# Patient Record
Sex: FEMALE | Race: WHITE
[De-identification: names, ages, dates, MRNs, and addresses within clinical notes are randomized per-mention and may not be internally consistent; named-entity substitution may affect disease eponyms.]

---

## 2019-01-23 ENCOUNTER — HOSPITAL ENCOUNTER (INPATIENT)
Dept: HOSPITAL 53 - M ED | Age: 24
LOS: 5 days | Discharge: HOME | DRG: 755 | End: 2019-01-28
Attending: PSYCHIATRY & NEUROLOGY | Admitting: PSYCHIATRY & NEUROLOGY
Payer: MEDICAID

## 2019-01-23 VITALS — BODY MASS INDEX: 34.23 KG/M2 | WEIGHT: 212.97 LBS | HEIGHT: 66 IN

## 2019-01-23 VITALS — DIASTOLIC BLOOD PRESSURE: 84 MMHG | SYSTOLIC BLOOD PRESSURE: 139 MMHG

## 2019-01-23 DIAGNOSIS — F60.6: ICD-10-CM

## 2019-01-23 DIAGNOSIS — R45.851: ICD-10-CM

## 2019-01-23 DIAGNOSIS — F32.9: ICD-10-CM

## 2019-01-23 DIAGNOSIS — F43.10: Primary | ICD-10-CM

## 2019-01-23 LAB
ALBUMIN SERPL BCG-MCNC: 3.8 GM/DL (ref 3.2–5.2)
ALT SERPL W P-5'-P-CCNC: 17 U/L (ref 12–78)
AMPHETAMINES UR QL SCN: NEGATIVE
APAP SERPL-MCNC: < 2 UG/ML (ref 10–30)
B-HCG SERPL QL: NEGATIVE
BARBITURATES UR QL SCN: NEGATIVE
BENZODIAZ UR QL SCN: NEGATIVE
BILIRUB CONJ SERPL-MCNC: 0.1 MG/DL (ref 0–0.2)
BILIRUB SERPL-MCNC: 0.4 MG/DL (ref 0.2–1)
BUN SERPL-MCNC: 8 MG/DL (ref 7–18)
BZE UR QL SCN: NEGATIVE
CALCIUM SERPL-MCNC: 8.6 MG/DL (ref 8.5–10.1)
CANNABINOIDS UR QL SCN: NEGATIVE
CHLORIDE SERPL-SCNC: 107 MEQ/L (ref 98–107)
CO2 SERPL-SCNC: 25 MEQ/L (ref 21–32)
CREAT SERPL-MCNC: 0.71 MG/DL (ref 0.55–1.3)
ETHANOL SERPL-MCNC: < 0.003 % (ref 0–0.01)
GFR SERPL CREATININE-BSD FRML MDRD: > 60 ML/MIN/{1.73_M2} (ref 60–?)
GLUCOSE SERPL-MCNC: 94 MG/DL (ref 70–100)
HCT VFR BLD AUTO: 42.1 % (ref 36–47)
HGB BLD-MCNC: 13.8 G/DL (ref 12–15.5)
MCH RBC QN AUTO: 27.8 PG (ref 27–33)
MCHC RBC AUTO-ENTMCNC: 32.8 G/DL (ref 32–36.5)
MCV RBC AUTO: 84.7 FL (ref 80–96)
METHADONE UR QL SCN: NEGATIVE
OPIATES UR QL SCN: NEGATIVE
PCP UR QL SCN: NEGATIVE
PLATELET # BLD AUTO: 432 10^3/UL (ref 150–450)
POTASSIUM SERPL-SCNC: 4.1 MEQ/L (ref 3.5–5.1)
PROT SERPL-MCNC: 7.6 GM/DL (ref 6.4–8.2)
RBC # BLD AUTO: 4.97 10^6/UL (ref 4–5.4)
SALICYLATES SERPL-MCNC: < 1.7 MG/DL (ref 5–30)
SODIUM SERPL-SCNC: 140 MEQ/L (ref 136–145)
TSH SERPL DL<=0.005 MIU/L-ACNC: 1.7 UIU/ML (ref 0.36–3.74)
WBC # BLD AUTO: 7.7 10^3/UL (ref 4–10)

## 2019-01-24 VITALS — DIASTOLIC BLOOD PRESSURE: 70 MMHG | SYSTOLIC BLOOD PRESSURE: 119 MMHG

## 2019-01-24 VITALS — DIASTOLIC BLOOD PRESSURE: 53 MMHG | SYSTOLIC BLOOD PRESSURE: 105 MMHG

## 2019-01-24 RX ADMIN — FLUTICASONE PROPIONATE SCH SPRAY: 50 SPRAY, METERED NASAL at 11:07

## 2019-01-24 RX ADMIN — CIPROFLOXACIN HYDROCHLORIDE SCH MG: 500 TABLET, FILM COATED ORAL at 17:14

## 2019-01-24 NOTE — ECGEPIP
Stationary ECG Study

                              [facility]

                                       

                                       Test Date:    2019

Pat Name:     TOMMY JORDAN             Department:   

Patient ID:   [id number]                 Room:         [place] [address]

Gender:       F                        Technician:   JUVENAL

:          1995               Requested By: Lisbet Cuevas 

Order Number: [id number]     Reading MD:   Juan Sams

                                 Measurements

Intervals                              Axis          

Rate:         76                       P:            37

MO:           142                      QRS:          52

QRSD:         86                       T:            34

QT:           402                                    

QTc:          453                                    

                           Interpretive Statements

Normal sinus rhythm with sinus arrhythmia

Delayed anterior R-wave progression

Nonspecific repolarization abnormalities

Comparison tracing available

Electronically Signed On 2019 19:14:06 EST by Juan Sams

## 2019-01-24 NOTE — HPEPDOC
[facility] Medical History & Physical


Date of Admission


Jan 23, 2019





History and Physical





PCP: None


ATTENDING: Dr. Carlos Daugherty





HPI: 23yoF admitted to [facility] for unspecified depressive disordder,  being 

medically examined today. 


The patient states she was recently treated for a right ear infection. She 

states symptoms are improved. She denies ear pain. She denies any fevers, 

chills, sore throat, rhinorrhea however she does still have some pressure in the

right ear.


Denies any fevers, chills, weakness, fatigue, HA, CP, SOB, cough, palpitations, 

abdominal pain, N/V/D or changes in bowel or bladder habits.





PMHx: 


Anxiety


Depression


PTSD





PSHX:


Oral surgery











SOCHX:


Resides in: [facility]


Marital Status: 


Kids: 1


Employment: Unemployed


Tobacco use: Denies


ETOH: Less than one drink per month


Illicit Drugs: Denies


IV Drug Use: Denies


Tattoos done unprofessionally: Denies 





FAMHX:


Mother: Alive, bipolar disorder


Father: Alive, COPD/PTSD


Siblings: One brother Alive, well


Children: Alive, well


Unexpected deaths due to medical reasons: None.





ROS:


As noted in HPI, otherwise 11pt ROS of systems reviewed and remarkable only for 

LMP 1/22/19


                 


PE:      


GEN: 24 yo F, appears stated age. Well-nourished, well developed. No acute 

distress. Alert and oriented x 3. Pleasant, interactive. 


HEENT: Normocephalic, atraumatic. Pupils are equal, round, and reactive to 

light. Extraocular movements are intact. No nystagmus appreciated. Sclera are 

nonicteric. Conjunctiva without injection. Nose midline. Nasal turbinates with

out bogginess. EACs both patent BL. The left TM visualized and gray with good 

cone of light, no bulging or erythema. There is serous fluid noted behind the 

right TM. No bulging. No erythema. No facial asymmetry. Moist mucous membranes. 

Dentition fair. Pharynx pink and moist, no cobblestoning. Neck supple, trachea 

midline. No lymphadenopathy or thyromegaly appreciated. 


CHEST: Regular rate and rhythm, +S1, +S2


LUNGS: Clear to auscultation bilaterally. No wheezes, rales, or rhonchi. 

Breathing appears symmetric and easy. Patient is speaking in full sentences. No 

accessory muscle use.


ABD: Round, soft, non-tender, non-distended. +Bowel sounds throughout. No 

rebound or guarding. No costovertebral angle tenderness.


EXT: Pulses 2+ bilaterally dorsalis pedis and radial. No lower extremity edema 

appreciated.


SKIN: Pink, dry, warm. Capillary refill <2sec. No rashes.


NEURO: Alert and oriented x 3. Cranial nerves III-XII are intact. No focal 

deficits appreciated. 





EKG:  Pending





A&P: 23yoF admitted to [facility] for unspecified depressive disordder


1. Psych. Plan per Psychiatry. Obtain baseline EKG to assure the safety of 

psychiatric medications as they can prolong the QT interval.


2. Follow up. No Primary Care Provider. Will attempt to establish PCP on 

discharge.


3. Rt Eustachian tube dysfunction. 


Pt is afebrile. 


No leukocytosis. 


Add Flonase 2 sprays each nostril daily. 


Monitor. 


4. Staff member Romelia WANG present throughout exam.





Vital Signs





Vital Signs








  Date Time  Temp Pulse Resp B/P (MAP) Pulse Ox O2 Delivery O2 Flow Rate FiO2


 


1/24/19 06:42 97.0 88 14 105/53 (70)  Room Air  


 


1/23/19 18:28     97   











Laboratory Data


Labs 24H


Laboratory Tests 2


1/23/19 13:55: 


Nucleated Red Blood Cells % (auto) 0.0, Anion Gap 8, Glomerular Filtration Rate 

> 60.0, Calcium Level 8.6, Aspartate Amino Transf (AST/SGOT) 16, Alanine 

Aminotransferase (ALT/SGPT) 17, Alkaline Phosphatase 84, Total Bilirubin 0.4, 

Direct Bilirubin 0.1, Total Protein 7.6, Albumin 3.8, Albumin/Globulin Ratio 

1.00, Thyroid Stimulating Hormone (TSH) 1.700, Human Chorionic Gonadotropin, 

Qual NEGATIVE, Salicylates Level < 1.7L, Urine Amphetamines Screen NEGATIVE, 

Urine Benzodiazepines Screen NEGATIVE, Urine Opiates Screen NEGATIVE, Urine 

Methadone Screen NEGATIVE, Acetaminophen Level < 2.0L, Urine Barbiturates Screen

NEGATIVE, Urine Phencyclidine Screen NEGATIVE, Urine Cocaine Metabolite Screen 

NEGATIVE, Urine Cannabinoids Screen NEGATIVE, Ethyl Alcohol Level < 0.003


CBC/BMP


Laboratory Tests


1/23/19 13:55








Red Blood Count 4.97, Mean Corpuscular Volume 84.7, Mean Corpuscular Hemoglobin 

27.8, Mean Corpuscular Hemoglobin Concent 32.8, Red Cell Distribution Width 13.2





Home Medications


Scheduled


Ciprofloxacin HCl (Ciprofloxacin HCl) 500 Mg Tab, 500 MG PO BID





Allergies


Coded Allergies:  


     No Known Allergies (Unverified , 1/23/19)











Lisbet Cuevas              Jan 24, 2019 09:58
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
## 2019-01-25 VITALS — DIASTOLIC BLOOD PRESSURE: 70 MMHG | SYSTOLIC BLOOD PRESSURE: 119 MMHG

## 2019-01-25 VITALS — SYSTOLIC BLOOD PRESSURE: 124 MMHG | DIASTOLIC BLOOD PRESSURE: 84 MMHG

## 2019-01-25 RX ADMIN — CIPROFLOXACIN HYDROCHLORIDE SCH MG: 500 TABLET, FILM COATED ORAL at 05:54

## 2019-01-25 RX ADMIN — FLUTICASONE PROPIONATE SCH SPRAY: 50 SPRAY, METERED NASAL at 09:02

## 2019-01-25 RX ADMIN — CIPROFLOXACIN HYDROCHLORIDE SCH MG: 500 TABLET, FILM COATED ORAL at 17:50
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
## 2019-01-26 VITALS — DIASTOLIC BLOOD PRESSURE: 80 MMHG | SYSTOLIC BLOOD PRESSURE: 132 MMHG

## 2019-01-26 VITALS — SYSTOLIC BLOOD PRESSURE: 96 MMHG | DIASTOLIC BLOOD PRESSURE: 51 MMHG

## 2019-01-26 RX ADMIN — CIPROFLOXACIN HYDROCHLORIDE SCH MG: 500 TABLET, FILM COATED ORAL at 13:29

## 2019-01-26 RX ADMIN — FLUTICASONE PROPIONATE SCH SPRAY: 50 SPRAY, METERED NASAL at 08:29

## 2019-01-26 RX ADMIN — CIPROFLOXACIN HYDROCHLORIDE SCH MG: 500 TABLET, FILM COATED ORAL at 17:46

## 2019-01-26 RX ADMIN — CIPROFLOXACIN HYDROCHLORIDE SCH MG: 500 TABLET, FILM COATED ORAL at 08:29

## 2019-01-27 VITALS — DIASTOLIC BLOOD PRESSURE: 63 MMHG | SYSTOLIC BLOOD PRESSURE: 144 MMHG

## 2019-01-27 VITALS — DIASTOLIC BLOOD PRESSURE: 78 MMHG | SYSTOLIC BLOOD PRESSURE: 132 MMHG

## 2019-01-27 RX ADMIN — CIPROFLOXACIN HYDROCHLORIDE SCH MG: 500 TABLET, FILM COATED ORAL at 08:52

## 2019-01-27 RX ADMIN — CIPROFLOXACIN HYDROCHLORIDE SCH MG: 500 TABLET, FILM COATED ORAL at 12:40

## 2019-01-27 RX ADMIN — FLUTICASONE PROPIONATE SCH SPRAY: 50 SPRAY, METERED NASAL at 08:51

## 2019-01-27 RX ADMIN — CIPROFLOXACIN HYDROCHLORIDE SCH MG: 500 TABLET, FILM COATED ORAL at 17:34
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
## 2019-01-28 VITALS — SYSTOLIC BLOOD PRESSURE: 153 MMHG | DIASTOLIC BLOOD PRESSURE: 81 MMHG

## 2019-01-28 RX ADMIN — FLUTICASONE PROPIONATE SCH SPRAY: 50 SPRAY, METERED NASAL at 08:13

## 2019-01-28 RX ADMIN — CIPROFLOXACIN HYDROCHLORIDE SCH MG: 500 TABLET, FILM COATED ORAL at 08:13
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
## 2019-02-25 ENCOUNTER — HOSPITAL ENCOUNTER (INPATIENT)
Dept: HOSPITAL 53 - M ED | Age: 24
LOS: 4 days | Discharge: HOME | DRG: 254 | End: 2019-03-01
Attending: HOSPITALIST | Admitting: HOSPITALIST
Payer: COMMERCIAL

## 2019-02-25 ENCOUNTER — HOSPITAL ENCOUNTER (OUTPATIENT)
Dept: HOSPITAL 53 - M LAB REF | Age: 24
End: 2019-02-25
Attending: PHYSICIAN ASSISTANT
Payer: MEDICAID

## 2019-02-25 VITALS — WEIGHT: 237.88 LBS | HEIGHT: 66 IN | BODY MASS INDEX: 38.23 KG/M2

## 2019-02-25 DIAGNOSIS — R11.2: ICD-10-CM

## 2019-02-25 DIAGNOSIS — Z79.899: ICD-10-CM

## 2019-02-25 DIAGNOSIS — E87.6: ICD-10-CM

## 2019-02-25 DIAGNOSIS — N10: Primary | ICD-10-CM

## 2019-02-25 DIAGNOSIS — N39.0: ICD-10-CM

## 2019-02-25 DIAGNOSIS — K62.89: Primary | ICD-10-CM

## 2019-02-25 DIAGNOSIS — E66.9: ICD-10-CM

## 2019-02-25 DIAGNOSIS — R19.7: ICD-10-CM

## 2019-02-25 DIAGNOSIS — N10: ICD-10-CM

## 2019-02-25 DIAGNOSIS — F43.10: ICD-10-CM

## 2019-02-25 DIAGNOSIS — D72.829: ICD-10-CM

## 2019-02-25 LAB
APPEARANCE UR: (no result)
BACTERIA UR QL AUTO: (no result)
BASOPHILS # BLD AUTO: 0.1 10^3/UL (ref 0–0.2)
BASOPHILS # BLD AUTO: 0.1 10^3/UL (ref 0–0.2)
BASOPHILS NFR BLD AUTO: 0.2 % (ref 0–1)
BASOPHILS NFR BLD AUTO: 0.2 % (ref 0–1)
BILIRUB UR QL STRIP.AUTO: NEGATIVE
BUN SERPL-MCNC: 6 MG/DL (ref 7–18)
BUN SERPL-MCNC: 9 MG/DL (ref 7–18)
CALCIUM SERPL-MCNC: 7.8 MG/DL (ref 8.5–10.1)
CALCIUM SERPL-MCNC: 8.4 MG/DL (ref 8.5–10.1)
CHLORIDE SERPL-SCNC: 100 MEQ/L (ref 98–107)
CHLORIDE SERPL-SCNC: 104 MEQ/L (ref 98–107)
CO2 SERPL-SCNC: 20 MEQ/L (ref 21–32)
CO2 SERPL-SCNC: 24 MEQ/L (ref 21–32)
CREAT SERPL-MCNC: 0.78 MG/DL (ref 0.55–1.3)
CREAT SERPL-MCNC: 0.83 MG/DL (ref 0.55–1.3)
EOSINOPHIL # BLD AUTO: 0 10^3/UL (ref 0–0.5)
EOSINOPHIL # BLD AUTO: 0 10^3/UL (ref 0–0.5)
EOSINOPHIL NFR BLD AUTO: 0 % (ref 0–3)
EOSINOPHIL NFR BLD AUTO: 0.1 % (ref 0–3)
FLUAV RNA UPPER RESP QL NAA+PROBE: NEGATIVE
FLUBV RNA UPPER RESP QL NAA+PROBE: NEGATIVE
GFR SERPL CREATININE-BSD FRML MDRD: > 60 ML/MIN/{1.73_M2} (ref 60–?)
GFR SERPL CREATININE-BSD FRML MDRD: > 60 ML/MIN/{1.73_M2} (ref 60–?)
GLUCOSE SERPL-MCNC: 108 MG/DL (ref 70–100)
GLUCOSE SERPL-MCNC: 130 MG/DL (ref 70–100)
GLUCOSE UR QL STRIP.AUTO: NEGATIVE MG/DL
HCT VFR BLD AUTO: 34.6 % (ref 36–47)
HCT VFR BLD AUTO: 37.2 % (ref 36–47)
HGB BLD-MCNC: 11.8 G/DL (ref 12–15.5)
HGB BLD-MCNC: 11.8 G/DL (ref 12–15.5)
HGB UR QL STRIP.AUTO: (no result)
KETONES UR QL STRIP.AUTO: (no result) MG/DL
LEUKOCYTE ESTERASE UR QL STRIP.AUTO: (no result)
LYMPHOCYTES # BLD AUTO: 0.9 10^3/UL (ref 1.5–6.5)
LYMPHOCYTES # BLD AUTO: 1 10^3/UL (ref 1.5–6.5)
LYMPHOCYTES NFR BLD AUTO: 3.1 % (ref 24–44)
LYMPHOCYTES NFR BLD AUTO: 3.9 % (ref 24–44)
MCH RBC QN AUTO: 27.3 PG (ref 27–33)
MCH RBC QN AUTO: 27.9 PG (ref 27–33)
MCHC RBC AUTO-ENTMCNC: 31.7 G/DL (ref 32–36.5)
MCHC RBC AUTO-ENTMCNC: 34.1 G/DL (ref 32–36.5)
MCV RBC AUTO: 81.8 FL (ref 80–96)
MCV RBC AUTO: 85.9 FL (ref 80–96)
MONOCYTES # BLD AUTO: 1.7 10^3/UL (ref 0–0.8)
MONOCYTES # BLD AUTO: 2 10^3/UL (ref 0–0.8)
MONOCYTES NFR BLD AUTO: 6.8 % (ref 0–5)
MONOCYTES NFR BLD AUTO: 6.9 % (ref 0–5)
MUCOUS THREADS URNS QL MICRO: (no result)
NEUTROPHILS # BLD AUTO: 21.5 10^3/UL (ref 1.8–7.7)
NEUTROPHILS # BLD AUTO: 25.2 10^3/UL (ref 1.8–7.7)
NEUTROPHILS NFR BLD AUTO: 88 % (ref 36–66)
NEUTROPHILS NFR BLD AUTO: 89 % (ref 36–66)
NITRITE UR QL STRIP.AUTO: NEGATIVE
PH UR STRIP.AUTO: 5 UNITS (ref 5–9)
PLATELET # BLD AUTO: 323 10^3/UL (ref 150–450)
PLATELET # BLD AUTO: 346 10^3/UL (ref 150–450)
POTASSIUM SERPL-SCNC: 3.4 MEQ/L (ref 3.5–5.1)
POTASSIUM SERPL-SCNC: 3.6 MEQ/L (ref 3.5–5.1)
PROT UR QL STRIP.AUTO: (no result) MG/DL
RBC # BLD AUTO: 4.23 10^6/UL (ref 4–5.4)
RBC # BLD AUTO: 4.33 10^6/UL (ref 4–5.4)
RBC # UR AUTO: (no result) /HPF (ref 0–3)
SODIUM SERPL-SCNC: 135 MEQ/L (ref 136–145)
SODIUM SERPL-SCNC: 136 MEQ/L (ref 136–145)
SP GR UR STRIP.AUTO: 1.01 (ref 1–1.03)
SQUAMOUS #/AREA URNS AUTO: 84 /HPF (ref 0–6)
UROBILINOGEN UR QL STRIP.AUTO: 4 MG/DL (ref 0–2)
WBC # BLD AUTO: 24.5 10^3/UL (ref 4–10)
WBC # BLD AUTO: 28.3 10^3/UL (ref 4–10)
WBC #/AREA URNS AUTO: (no result) /HPF (ref 0–3)

## 2019-02-26 VITALS — SYSTOLIC BLOOD PRESSURE: 131 MMHG | DIASTOLIC BLOOD PRESSURE: 71 MMHG

## 2019-02-26 VITALS — DIASTOLIC BLOOD PRESSURE: 55 MMHG | SYSTOLIC BLOOD PRESSURE: 106 MMHG

## 2019-02-26 VITALS — DIASTOLIC BLOOD PRESSURE: 68 MMHG | SYSTOLIC BLOOD PRESSURE: 110 MMHG

## 2019-02-26 VITALS — DIASTOLIC BLOOD PRESSURE: 54 MMHG | SYSTOLIC BLOOD PRESSURE: 102 MMHG

## 2019-02-26 VITALS — DIASTOLIC BLOOD PRESSURE: 65 MMHG | SYSTOLIC BLOOD PRESSURE: 130 MMHG

## 2019-02-26 VITALS — DIASTOLIC BLOOD PRESSURE: 54 MMHG | SYSTOLIC BLOOD PRESSURE: 112 MMHG

## 2019-02-26 LAB
ALBUMIN SERPL BCG-MCNC: 2.6 GM/DL (ref 3.2–5.2)
ALT SERPL W P-5'-P-CCNC: 35 U/L (ref 12–78)
B-HCG SERPL QL: NEGATIVE
BASOPHILS # BLD AUTO: 0.1 10^3/UL (ref 0–0.2)
BASOPHILS NFR BLD AUTO: 0.2 % (ref 0–1)
BILIRUB SERPL-MCNC: 1.2 MG/DL (ref 0.2–1)
BUN SERPL-MCNC: 7 MG/DL (ref 7–18)
CALCIUM SERPL-MCNC: 7.7 MG/DL (ref 8.5–10.1)
CHLAMYDIA DNA AMPLIFICATION: NEGATIVE
CHLORIDE SERPL-SCNC: 110 MEQ/L (ref 98–107)
CO2 SERPL-SCNC: 21 MEQ/L (ref 21–32)
CREAT SERPL-MCNC: 0.66 MG/DL (ref 0.55–1.3)
CRP SERPL-MCNC: 27.8 MG/DL (ref 0–0.3)
EOSINOPHIL # BLD AUTO: 0.1 10^3/UL (ref 0–0.5)
EOSINOPHIL NFR BLD AUTO: 0.2 % (ref 0–3)
GFR SERPL CREATININE-BSD FRML MDRD: > 60 ML/MIN/{1.73_M2} (ref 60–?)
GLUCOSE SERPL-MCNC: 122 MG/DL (ref 70–100)
HCT VFR BLD AUTO: 32.2 % (ref 36–47)
HGB BLD-MCNC: 10.7 G/DL (ref 12–15.5)
LYMPHOCYTES # BLD AUTO: 0.6 10^3/UL (ref 1.5–6.5)
LYMPHOCYTES NFR BLD AUTO: 2.3 % (ref 24–44)
MAGNESIUM SERPL-MCNC: 1.8 MG/DL (ref 1.8–2.4)
MCH RBC QN AUTO: 27.6 PG (ref 27–33)
MCHC RBC AUTO-ENTMCNC: 33.2 G/DL (ref 32–36.5)
MCV RBC AUTO: 83.2 FL (ref 80–96)
MONOCYTES # BLD AUTO: 2 10^3/UL (ref 0–0.8)
MONOCYTES NFR BLD AUTO: 7.5 % (ref 0–5)
N GONORRHOEA RRNA SPEC QL NAA+PROBE: NEGATIVE
NEUTROPHILS # BLD AUTO: 23.5 10^3/UL (ref 1.8–7.7)
NEUTROPHILS NFR BLD AUTO: 88.2 % (ref 36–66)
PLATELET # BLD AUTO: 285 10^3/UL (ref 150–450)
POTASSIUM SERPL-SCNC: 3.2 MEQ/L (ref 3.5–5.1)
PROT SERPL-MCNC: 6.9 GM/DL (ref 6.4–8.2)
RBC # BLD AUTO: 3.87 10^6/UL (ref 4–5.4)
SODIUM SERPL-SCNC: 140 MEQ/L (ref 136–145)
WBC # BLD AUTO: 26.7 10^3/UL (ref 4–10)

## 2019-02-26 RX ADMIN — SODIUM CHLORIDE SCH MLS/HR: 9 INJECTION, SOLUTION INTRAVENOUS at 19:51

## 2019-02-26 RX ADMIN — SODIUM CHLORIDE SCH MLS/HR: 9 INJECTION, SOLUTION INTRAVENOUS at 10:51

## 2019-02-26 RX ADMIN — ACETAMINOPHEN PRN MG: 325 TABLET ORAL at 19:06

## 2019-02-26 RX ADMIN — SODIUM CHLORIDE SCH UNITS: 4.5 INJECTION, SOLUTION INTRAVENOUS at 20:51

## 2019-02-26 RX ADMIN — METRONIDAZOLE SCH MLS/HR: 500 INJECTION, SOLUTION INTRAVENOUS at 17:42

## 2019-02-26 RX ADMIN — SODIUM CHLORIDE SCH MLS/HR: 9 INJECTION, SOLUTION INTRAVENOUS at 01:49

## 2019-02-26 RX ADMIN — METRONIDAZOLE SCH MLS/HR: 500 INJECTION, SOLUTION INTRAVENOUS at 10:39

## 2019-02-26 RX ADMIN — SODIUM CHLORIDE SCH MLS/HR: 9 INJECTION, SOLUTION INTRAVENOUS at 04:20

## 2019-02-26 RX ADMIN — ACETAMINOPHEN PRN MG: 325 TABLET ORAL at 12:42

## 2019-02-26 RX ADMIN — CIPROFLOXACIN SCH MLS/HR: 2 INJECTION, SOLUTION INTRAVENOUS at 12:30

## 2019-02-26 NOTE — HPE
DATE OF ADMISSION:  02/26/2019

 

HISTORY OF PRESENT ILLNESS (HPI): This is a 23-year-old female with a past

medical history of post traumatic stress disorder (PTSD) and depression who

presents to the emergency room with the chief complaint of watery diarrhea for

the past three days, no associated abdominal pain, nausea or vomiting. She did

have a subjective feeling of fever, aches and chills. She never had similar

episodes in the past. She has not been around any sick contacts. In the emergency

room (ER) she was found to have 102.7 temperature. She was given Tylenol which

dropped her down to 101.8.  She was started on intravenous  (IV) fluids and given

a gram of Rocephin and a gram of Flagyl.

 

CT of the abdomen and pelvis revealed proctitis.

 

Urinalysis  was strongly positive for urinary tract infection (UTI).

 

The patient denies any dysuria, frequency or urgency. She will be admitted for

further management. Again, past medical history of PTSD and depression.

 

ALLERGIES: No known drug allergies.

 

FAMILY HISTORY:  Negative for inflammatory bowel disease.

 

SOCIAL HISTORY: The patient denies tobacco, alcohol or illicit drugs.

 

MEDICATIONS:

- Prozac 10 mg orally daily

- hydroxyzine 25 mg orally every 6 hours as needed

- trazodone 50 mg orally at bedtime

 

REVIEW OF SYSTEMS: Negative for all ten major systems except what has been

mentioned in the HPI.

 

PHYSICAL EXAMINATION:

VITAL SIGNS: Blood pressure 110/68, heart rate 112 and regular, respiratory rate

20, temperature 101.8, Oxygen saturation 96% on room air.

HEENT:  Normocephalic atraumatic.

NECK: Supple, no jugular venous distention (JVD).

LUNGS: Clear to auscultation.

HEART: S1 audible, no murmurs are appreciated.

ABDOMEN:  Soft, positive bowel sounds.

EXTREMITIES: No pedal edema.

SKIN: Intact.

NEUROLOGIC EXAMINATION:  The patient is awake, alert and oriented times three.

 

LABORATORY DATA: WBC 20.3, hemoglobin 11.8, hematocrit 34.6, platelets 323,000

with a left shift. Sodium 136, potassium 3.4, chloride 104, CO2 20, anion gap 12,

BUN 9, creatinine 0.78, glucose 130, lactic acid 1.2.

 

Urinalysis  strongly positive for UTI.  Influenza swab for A and B are negative.

 

 

IMPRESSION:

1. Proctitis.

2. Urinary tract infection.

3. Sepsis.

 

PLAN: The patient will be admitted to the med-surgical floor. Will continue the

patient on intravenous  Cipro 400 mg every 12 hours and Flagyl 500 every 8 and

will continue her on intravenous  fluids and normal saline 150 mL per hour. Give

her Tylenol as needed  for fevers. Once the inflammation has subsided will

likely get gastrointestinal to see the patient and likely have a colonoscopy

performed. The location of this inflammation could yield a possible etiology of

Crohn's versus ulcerative colitis as a possible etiology. Blood cultures and

urine cultures have been sent, will follow the results. Will continue her

preadmission medications and continues her care on the med-surg floor.

## 2019-02-26 NOTE — REPVR
EXAM: 

 CT Abdomen and Pelvis Without Contrast 



EXAM DATE/TIME: 

 2/25/2019 11:49 PM 



CLINICAL HISTORY: 

 23 years old, female; Pain; Abdominal pain; Localized; Lower; Additional info: 

Colic 



TECHNIQUE: 

 Axial computed tomography images of the abdomen and pelvis without contrast. 

 All CT scans at this facility use at least one of these dose optimization 

techniques: automated exposure control; mA and/or kV adjustment per patient 

size (includes targeted exams where dose is matched to clinical indication); or 

iterative reconstruction. 

 Coronal and sagittal reformatted images were created and reviewed. 



COMPARISON: 

 No relevant prior studies available. 



FINDINGS: 

 Limitations: Examination is limited without IV contrast.

 Lower thorax: No acute findings. 



ABDOMEN: 

 Liver: Normal. No mass. 

 Gallbladder and bile ducts: Normal. No calcified stones. No ductal dilation. 

 Pancreas: Normal. No ductal dilation. 

 Spleen: Normal. No splenomegaly. 

 Adrenals: Normal. No mass. 

 Kidneys and ureters: No hydronephrosis bilaterally. Circumscribed hypodense 

lesion in the upper pole of the kidney measuring 16 mm. 

 Stomach and bowel:  Diffuse thickening of the rectum. Severe perirectal 

haziness.  No abnormal bowel dilatation. Negative for colonic diverticulitis.  

Moderate stool in the colon. Fluid and stool in the right colon. 

 Appendix: The appendix is not seen.  However, there is no evidence of 

appendicitis.



PELVIS: 

 Bladder: Unremarkable as visualized. 

 Reproductive:  Uterus is normal. 

 Subperitoneal space:  Presacral edema. 



ABDOMEN and PELVIS: 

 Intraperitoneal space: Normal. No free air. No significant fluid collection. 

 Bones/joints: No acute fracture. No dislocation. 

 Soft tissues:  Small umbilical hernia containing fat. There is no evidence of 

strangulation. 

 Vasculature: Normal. No abdominal aortic aneurysm. 

 Lymph nodes:  Multiple small perirectal nodes. Multiple small periaortic nodes.



IMPRESSION: 

Diffuse thickening of the rectum with inflammatory changes. Suspicious for 

proctitis. Malignancy cannot be excluded.

Left renal cyst lesion consistent with cyst. No followup is necessary.

Additional findings as described.





Electronically signed by: Alan Rosario On 02/26/2019  00:44:05 AM

## 2019-02-26 NOTE — IPN
DATE:   02/26/2019

 

The patient was admitted overnight. Continued to have diarrhea.  Is currently

afebrile.  Denies any chest pain, chest pressure or discomfort.  No further

vomiting last night.

 

VITAL SIGNS:  Temperature 97.1, pulse 110, respirations 18, blood pressure

130/65, pulse oximetry 97% on room air.

 

LABORATORY DATA:

WBC 26.7, hemoglobin and hematocrit 10.7/32.2, platelets 285.

 

Chemistry:  Sodium 140, potassium 3.2, chloride 110, bicarbonate 21, BUN 7,

creatinine 0.66, bilirubin 1.2, C-reactive protein 27.8.  Gonorrhea and Chlamydia

negative.  Influenza negative.  GI panel has been negative.

 

PHYSICAL EXAMINATION:

GENERAL:  The patient is alert, comfortable and in no acute distress.  Obese.

HEENT:  Normocephalic, atraumatic.

PULMONARY:  Bilaterally clear.

CARDIAC:  Regular. S1, S2.

ABDOMEN:  Soft, nontender.  Positive bowel sounds.

EXTREMITIES:  No edema in bilateral lower extremities.

 

ASSESSMENT AND PLAN:

This is a 23-year-old female patient with underlying medical history of

posttraumatic stress disorder (PTSD), depression, who presented to [facility] emergency room with watery diarrhea for 3 days with no abdominal

pain, reported nausea and vomiting and found to have urinary tract infection

(UTI) and proctitis and sepsis.

 

1.  Urinary tract infection, proctitis.  CT scan appreciated.  Followup blood

culture.  Followup urine culture.  Strep negative.  GI panel has been negative.

IV fluids. Cipro and Flagyl for now.  Monitor C-reactive protein.  Monitor

complete blood count (CBC).  IV fluids.

 

2.  Depression and posttraumatic stress disorder (PTSD).  Continue current

medications.

 

3.  Nausea and vomiting.  Zofran as needed.

 

4.  Hypokalemia.  Supplement potassium.  Followup magnesium.

 

5.  Obesity, complicating care.

 

6.  Deep vein thrombosis (DVT) prophylaxis.  Heparin subcutaneously.

 

DISPOSITION:  Pending clinical improvement.

## 2019-02-27 VITALS — DIASTOLIC BLOOD PRESSURE: 76 MMHG | SYSTOLIC BLOOD PRESSURE: 122 MMHG

## 2019-02-27 VITALS — DIASTOLIC BLOOD PRESSURE: 60 MMHG | SYSTOLIC BLOOD PRESSURE: 97 MMHG

## 2019-02-27 VITALS — DIASTOLIC BLOOD PRESSURE: 72 MMHG | SYSTOLIC BLOOD PRESSURE: 117 MMHG

## 2019-02-27 VITALS — DIASTOLIC BLOOD PRESSURE: 77 MMHG | SYSTOLIC BLOOD PRESSURE: 128 MMHG

## 2019-02-27 LAB
BUN SERPL-MCNC: 4 MG/DL (ref 7–18)
CALCIUM SERPL-MCNC: 7.3 MG/DL (ref 8.5–10.1)
CHLORIDE SERPL-SCNC: 112 MEQ/L (ref 98–107)
CO2 SERPL-SCNC: 20 MEQ/L (ref 21–32)
CREAT SERPL-MCNC: 0.49 MG/DL (ref 0.55–1.3)
CRP SERPL-MCNC: 29.6 MG/DL (ref 0–0.3)
GFR SERPL CREATININE-BSD FRML MDRD: > 60 ML/MIN/{1.73_M2} (ref 60–?)
GLUCOSE SERPL-MCNC: 93 MG/DL (ref 70–100)
HCT VFR BLD AUTO: 28.6 % (ref 36–47)
HGB BLD-MCNC: 9.3 G/DL (ref 12–15.5)
MAGNESIUM SERPL-MCNC: 1.9 MG/DL (ref 1.8–2.4)
MCH RBC QN AUTO: 27.5 PG (ref 27–33)
MCHC RBC AUTO-ENTMCNC: 32.5 G/DL (ref 32–36.5)
MCV RBC AUTO: 84.6 FL (ref 80–96)
PLATELET # BLD AUTO: 296 10^3/UL (ref 150–450)
POTASSIUM SERPL-SCNC: 3.3 MEQ/L (ref 3.5–5.1)
RBC # BLD AUTO: 3.38 10^6/UL (ref 4–5.4)
SODIUM SERPL-SCNC: 141 MEQ/L (ref 136–145)
WBC # BLD AUTO: 20.8 10^3/UL (ref 4–10)

## 2019-02-27 RX ADMIN — METRONIDAZOLE SCH MLS/HR: 500 INJECTION, SOLUTION INTRAVENOUS at 10:08

## 2019-02-27 RX ADMIN — SODIUM CHLORIDE SCH UNITS: 4.5 INJECTION, SOLUTION INTRAVENOUS at 10:09

## 2019-02-27 RX ADMIN — CIPROFLOXACIN SCH MLS/HR: 2 INJECTION, SOLUTION INTRAVENOUS at 13:06

## 2019-02-27 RX ADMIN — METRONIDAZOLE SCH MLS/HR: 500 INJECTION, SOLUTION INTRAVENOUS at 02:41

## 2019-02-27 RX ADMIN — SODIUM CHLORIDE SCH UNITS: 4.5 INJECTION, SOLUTION INTRAVENOUS at 20:15

## 2019-02-27 RX ADMIN — CIPROFLOXACIN SCH MLS/HR: 2 INJECTION, SOLUTION INTRAVENOUS at 00:37

## 2019-02-27 RX ADMIN — SODIUM CHLORIDE SCH MLS/HR: 9 INJECTION, SOLUTION INTRAVENOUS at 02:41

## 2019-02-27 RX ADMIN — METRONIDAZOLE SCH MLS/HR: 500 INJECTION, SOLUTION INTRAVENOUS at 18:34

## 2019-02-27 NOTE — IPNPDOC
Text Note


Date of Service


The patient was seen on 2/27/19.





NOTE


diarrhea improved.  Is currently afebrile.  Denies any chest pain, chest 

pressure or discomfort.  No further


vomiting.


 





 


PHYSICAL EXAMINATION:


GENERAL:  The patient is alert, comfortable and in no acute distress.  Obese.


HEENT:  Normocephalic, atraumatic.


PULMONARY:  Bilaterally clear.


CARDIAC:  Regular. S1, S2.


ABDOMEN:  Soft, nontender.  Positive bowel sounds.


EXTREMITIES:  No edema in bilateral lower extremities.


 


ASSESSMENT AND PLAN:


This is a 23-year-old female patient with underlying medical history of


posttraumatic stress disorder (PTSD), depression, who presented to [facility] emergency room with watery diarrhea for 3 days with no abdominal


pain, reported nausea and vomiting and found to have urinary tract infection


(UTI) and proctitis and sepsis.


 


1.  Urinary tract infection, proctitis.  CT scan appreciated.  Followup blood


culture.  Followup urine culture.  Strep negative.  GI panel has been negative.


off IV fluids tolerate PO.  Cipro and Flagyl for now.  Monitor C-reactive 

protein.  Monitor


complete blood count (CBC).  


 


2.  Depression and posttraumatic stress disorder (PTSD).  Continue current


medications.


 


3.  Nausea and vomiting.  Zofran as needed.


 


4.  Hypokalemia.  Supplement potassium.  Followup magnesium.


 


5.  Obesity, complicating care.


 


6.  Deep vein thrombosis (DVT) prophylaxis.  Heparin subcutaneously.


 


DISPOSITION:  Pending clinical improvement.





VS,Fishbone, I+O


VS, Fishbone, I+O


Laboratory Tests


2/27/19 06:26








Red Blood Count 3.38 L, Mean Corpuscular Volume 84.6, Mean Corpuscular 

Hemoglobin 27.5, Mean Corpuscular Hemoglobin Concent 32.5, Red Cell Distribution

Width 13.8, Calcium Level 7.3 L








Vital Signs








  Date Time  Temp Pulse Resp B/P (MAP) Pulse Ox O2 Delivery O2 Flow Rate FiO2


 


2/27/19 10:21  105  122/76 (91)    


 


2/27/19 06:00 99.4  16  94   


 


2/26/19 03:34      Room Air  














I&O- Last 24 Hours up to 6 AM 


 


 2/27/19





 05:59


 


Intake Total 5540 ml


 


Output Total 2400 ml


 


Balance 3140 ml

















LINH HOLDER MD                      Feb 27, 2019 18:28

## 2019-02-28 VITALS — DIASTOLIC BLOOD PRESSURE: 81 MMHG | SYSTOLIC BLOOD PRESSURE: 141 MMHG

## 2019-02-28 VITALS — SYSTOLIC BLOOD PRESSURE: 136 MMHG | DIASTOLIC BLOOD PRESSURE: 91 MMHG

## 2019-02-28 VITALS — DIASTOLIC BLOOD PRESSURE: 56 MMHG | SYSTOLIC BLOOD PRESSURE: 107 MMHG

## 2019-02-28 LAB
BUN SERPL-MCNC: 6 MG/DL (ref 7–18)
CALCIUM SERPL-MCNC: 8.1 MG/DL (ref 8.5–10.1)
CHLORIDE SERPL-SCNC: 111 MEQ/L (ref 98–107)
CO2 SERPL-SCNC: 25 MEQ/L (ref 21–32)
CREAT SERPL-MCNC: 0.51 MG/DL (ref 0.55–1.3)
CRP SERPL-MCNC: 27.8 MG/DL (ref 0–0.3)
GFR SERPL CREATININE-BSD FRML MDRD: > 60 ML/MIN/{1.73_M2} (ref 60–?)
GLUCOSE SERPL-MCNC: 85 MG/DL (ref 70–100)
HCT VFR BLD AUTO: 29.7 % (ref 36–47)
HGB BLD-MCNC: 9.6 G/DL (ref 12–15.5)
MAGNESIUM SERPL-MCNC: 2.2 MG/DL (ref 1.8–2.4)
MCH RBC QN AUTO: 27.5 PG (ref 27–33)
MCHC RBC AUTO-ENTMCNC: 32.3 G/DL (ref 32–36.5)
MCV RBC AUTO: 85.1 FL (ref 80–96)
PLATELET # BLD AUTO: 331 10^3/UL (ref 150–450)
POTASSIUM SERPL-SCNC: 3.6 MEQ/L (ref 3.5–5.1)
RBC # BLD AUTO: 3.49 10^6/UL (ref 4–5.4)
SODIUM SERPL-SCNC: 144 MEQ/L (ref 136–145)
WBC # BLD AUTO: 14.6 10^3/UL (ref 4–10)

## 2019-02-28 RX ADMIN — METRONIDAZOLE SCH MLS/HR: 500 INJECTION, SOLUTION INTRAVENOUS at 08:52

## 2019-02-28 RX ADMIN — CIPROFLOXACIN SCH MLS/HR: 2 INJECTION, SOLUTION INTRAVENOUS at 01:01

## 2019-02-28 RX ADMIN — SODIUM CHLORIDE SCH UNITS: 4.5 INJECTION, SOLUTION INTRAVENOUS at 08:52

## 2019-02-28 RX ADMIN — METRONIDAZOLE SCH MLS/HR: 500 INJECTION, SOLUTION INTRAVENOUS at 02:21

## 2019-02-28 RX ADMIN — METRONIDAZOLE SCH MLS/HR: 500 INJECTION, SOLUTION INTRAVENOUS at 17:56

## 2019-02-28 RX ADMIN — SODIUM CHLORIDE SCH UNITS: 4.5 INJECTION, SOLUTION INTRAVENOUS at 09:00

## 2019-02-28 RX ADMIN — CIPROFLOXACIN SCH MLS/HR: 2 INJECTION, SOLUTION INTRAVENOUS at 13:59

## 2019-02-28 RX ADMIN — SODIUM CHLORIDE SCH UNITS: 4.5 INJECTION, SOLUTION INTRAVENOUS at 21:00

## 2019-02-28 NOTE — IPNPDOC
Text Note


Date of Service


The patient was seen on 2/28/19.





NOTE


Diarrhea improved.  Stool more formed.  Denies any chest pain, chest pressure or

discomfort. Tolerating PO. 


 





 


PHYSICAL EXAMINATION:


GENERAL:  The patient is alert, comfortable and in no acute distress.  Obese.


HEENT:  Normocephalic, atraumatic.


PULMONARY:  Bilaterally clear.


CARDIAC:  Regular. S1, S2.


ABDOMEN:  Soft, nontender.  Positive bowel sounds.


EXTREMITIES:  No edema in bilateral lower extremities.


 


ASSESSMENT AND PLAN:


This is a 23-year-old female patient with underlying medical history of


posttraumatic stress disorder (PTSD), depression, who presented to [facility] emergency room with watery diarrhea for 3 days with no abdominal


pain, reported nausea and vomiting and found to have urinary tract infection


(UTI) and proctitis and sepsis.


 


1.  Urinary tract infection, proctitis.  CT scan appreciated.  Followup blood


culture.  Followup urine culture.  Strep negative.  GI panel has been negative.


off IV fluids tolerate PO.  Cipro and Flagyl for now.  Monitor C-reactive 

protein.  Monitor


complete blood count (CBC).  


 


2.  Depression and posttraumatic stress disorder (PTSD).  Continue current


medications.


 


3.  Nausea and vomiting.  Zofran as needed.


 


4.  Hypokalemia.  Supplement potassium.  Followup magnesium.


 


5.  Obesity, complicating care.


 


6.  Deep vein thrombosis (DVT) prophylaxis.  Heparin subcutaneously.


 


DISPOSITION:  Pending clinical improvement. likely DC in 24h





VS,Fishbone, I+O


VS, Fishbone, I+O


Laboratory Tests


2/28/19 06:22








Red Blood Count 3.49 L, Mean Corpuscular Volume 85.1, Mean Corpuscular 

Hemoglobin 27.5, Mean Corpuscular Hemoglobin Concent 32.3, Red Cell Distribution

Width 14.1, Calcium Level 8.1 L








Vital Signs








  Date Time  Temp Pulse Resp B/P (MAP) Pulse Ox O2 Delivery O2 Flow Rate FiO2


 


2/28/19 06:00 98.3 89 18 107/56 (73) 95   


 


2/26/19 03:34      Room Air  














I&O- Last 24 Hours up to 6 AM 


 


 2/28/19





 06:00


 


Intake Total 1540 ml


 


Output Total 1300 ml


 


Balance 240 ml

















LINH HOLDER MD                      Feb 28, 2019 14:41

## 2019-03-01 VITALS — SYSTOLIC BLOOD PRESSURE: 118 MMHG | DIASTOLIC BLOOD PRESSURE: 75 MMHG

## 2019-03-01 LAB
BUN SERPL-MCNC: 7 MG/DL (ref 7–18)
CALCIUM SERPL-MCNC: 7.3 MG/DL (ref 8.5–10.1)
CHLORIDE SERPL-SCNC: 109 MEQ/L (ref 98–107)
CO2 SERPL-SCNC: 26 MEQ/L (ref 21–32)
CREAT SERPL-MCNC: 0.35 MG/DL (ref 0.55–1.3)
CRP SERPL-MCNC: 14.5 MG/DL (ref 0–0.3)
GFR SERPL CREATININE-BSD FRML MDRD: > 60 ML/MIN/{1.73_M2} (ref 60–?)
GLUCOSE SERPL-MCNC: 83 MG/DL (ref 70–100)
HCT VFR BLD AUTO: 30.5 % (ref 36–47)
HGB BLD-MCNC: 10.1 G/DL (ref 12–15.5)
MAGNESIUM SERPL-MCNC: 2.4 MG/DL (ref 1.8–2.4)
MCH RBC QN AUTO: 27.2 PG (ref 27–33)
MCHC RBC AUTO-ENTMCNC: 33.1 G/DL (ref 32–36.5)
MCV RBC AUTO: 82 FL (ref 80–96)
PLATELET # BLD AUTO: 378 10^3/UL (ref 150–450)
POTASSIUM SERPL-SCNC: 3.6 MEQ/L (ref 3.5–5.1)
RBC # BLD AUTO: 3.72 10^6/UL (ref 4–5.4)
SODIUM SERPL-SCNC: 143 MEQ/L (ref 136–145)
WBC # BLD AUTO: 9.1 10^3/UL (ref 4–10)

## 2019-03-01 RX ADMIN — METRONIDAZOLE SCH MLS/HR: 500 INJECTION, SOLUTION INTRAVENOUS at 08:30

## 2019-03-01 RX ADMIN — SODIUM CHLORIDE SCH UNITS: 4.5 INJECTION, SOLUTION INTRAVENOUS at 08:31

## 2019-03-01 RX ADMIN — CIPROFLOXACIN SCH MLS/HR: 2 INJECTION, SOLUTION INTRAVENOUS at 00:51

## 2019-03-01 RX ADMIN — METRONIDAZOLE SCH MLS/HR: 500 INJECTION, SOLUTION INTRAVENOUS at 02:39

## 2019-03-01 NOTE — DSES
DATE OF ADMISSION:  02/26/2019

DATE OF DISCHARGE:  03/01/2019

 

FINAL DIAGNOSES:

1. Severe leukocytosis secondary to urinary tract infection, pyelonephritis, and

proctitis.

2. History of depression.

3. Posttraumatic stress disorder.

4. Nausea, vomiting, diarrhea.

5. Hypokalemia.

6. Obesity.

 

HISTORY OF PRESENT ILLNESS: This is a 23-year-old female patient with underlying

medical history of posttraumatic stress disorder, depression, presented to the

emergency room with chief complaint of watery diarrhea for the past 3 days

associated with suprapubic abdominal pain, nausea, and vomiting with subjective

fever, aches, and chills. Denies any previous episode. Denies any sick contact.

Patient was found to be febrile at 102.7 in the emergency room, given Tylenol and

intravenous (IV) fluids, started on Rocephin and Flagyl. Later changed to Cipro

and Flagyl.

 

HOSPITAL COURSE: Patient admitted to the hospital. Aggressive fluid hydration was

given. CT scan was appreciated. Cipro and Flagyl were provided. Patient's

C-reactive protein and white blood cells (WBC) have been followed. The patient's

condition gradually improved. Diet was advanced. Later fluids were discontinued.

Patient tolerated diet with resolving leukocytosis. Improving C-reactive protein.

Tolerating oral with mild nausea. Ready to be discharged for further care as

outpatient.

 

VITAL SIGNS: Temperature 97.6, pulse 77, respirations 18, blood pressure 118/75,

pulse oximetry 97% on room air.

 

LABORATORY DATA:

WBC 9.1, hemoglobin and hematocrit 10.1/30.5, platelets 378.

 

Chemistry: Sodium 143, potassium 3.6, chloride 103, bicarbonate 26, BUN 7,

creatinine 0.35.

 

GENERAL: Patient alert, comfortable in no acute distress.

HEENT: Normocephalic, atraumatic.

PULMONARY: Bilaterally clear.

CARDIAC: Regular, S1, S2.

ABDOMEN: Soft and nontender. Positive bowel sounds.

EXTREMITIES: No clubbing, cyanosis, or edema.

 

DISCHARGE MEDICATIONS:

- Cipro 500 mg by mouth twice a day for 7 days

- Flagyl 500 mg by mouth twice a day for 7 days

- Zofran 4 mg by mouth every 6 hours as needed

- Prozac 10 mg by mouth daily

- hydroxyzine 25 mg by mouth every 6 hours as needed

- trazodone 50 mg by mouth at bedtime as needed

 

DISCHARGE INSTRUCTIONS: Please see primary care provider in 7 days. Consider

gastrointestinal (GI) referral as per primary care provider for proctitis. Oral

hydration. Return to the hospital if symptoms worsen.

## 2019-04-10 ENCOUNTER — HOSPITAL ENCOUNTER (OUTPATIENT)
Dept: HOSPITAL 53 - M LAB REF | Age: 24
End: 2019-04-10
Attending: NURSE PRACTITIONER
Payer: COMMERCIAL

## 2019-04-10 DIAGNOSIS — Z13.9: Primary | ICD-10-CM

## 2019-04-10 LAB
25(OH)D3 SERPL-MCNC: 10.1 NG/ML (ref 30–100)
ALBUMIN SERPL BCG-MCNC: 3.7 GM/DL (ref 3.2–5.2)
ALT SERPL W P-5'-P-CCNC: 25 U/L (ref 12–78)
APPEARANCE UR: (no result)
BACTERIA UR QL AUTO: (no result)
BILIRUB SERPL-MCNC: 0.3 MG/DL (ref 0.2–1)
BILIRUB UR QL STRIP.AUTO: NEGATIVE
BUN SERPL-MCNC: 7 MG/DL (ref 7–18)
CALCIUM SERPL-MCNC: 8.3 MG/DL (ref 8.5–10.1)
CHLORIDE SERPL-SCNC: 109 MEQ/L (ref 98–107)
CHOLEST SERPL-MCNC: 186 MG/DL (ref ?–200)
CHOLEST/HDLC SERPL: 3.26 {RATIO} (ref ?–5)
CO2 SERPL-SCNC: 23 MEQ/L (ref 21–32)
CREAT SERPL-MCNC: 0.62 MG/DL (ref 0.55–1.3)
EOSINOPHIL NFR BLD MANUAL: 4 % (ref 0–5)
EST. AVERAGE GLUCOSE BLD GHB EST-MCNC: 100 MG/DL (ref 60–110)
GFR SERPL CREATININE-BSD FRML MDRD: > 60 ML/MIN/{1.73_M2} (ref 60–?)
GLUCOSE SERPL-MCNC: 103 MG/DL (ref 70–100)
GLUCOSE UR QL STRIP.AUTO: NEGATIVE MG/DL
HCT VFR BLD AUTO: 39.1 % (ref 36–47)
HDLC SERPL-MCNC: 57 MG/DL (ref 40–?)
HGB BLD-MCNC: 12.7 G/DL (ref 12–15.5)
HGB UR QL STRIP.AUTO: NEGATIVE
KETONES UR QL STRIP.AUTO: NEGATIVE MG/DL
LDLC SERPL CALC-MCNC: 113 MG/DL (ref ?–100)
LEUKOCYTE ESTERASE UR QL STRIP.AUTO: (no result)
LYMPHOCYTES NFR BLD MANUAL: 37 % (ref 16–52)
MCH RBC QN AUTO: 27.6 PG (ref 27–33)
MCHC RBC AUTO-ENTMCNC: 32.5 G/DL (ref 32–36.5)
MCV RBC AUTO: 85 FL (ref 80–96)
MONOCYTES NFR BLD MANUAL: 5 % (ref 0–8)
MUCOUS THREADS URNS QL MICRO: (no result)
NEUTROPHILS NFR BLD MANUAL: 52 % (ref 35–75)
NITRITE UR QL STRIP.AUTO: NEGATIVE
NONHDLC SERPL-MCNC: 129 MG/DL
PH UR STRIP.AUTO: 6 UNITS (ref 5–9)
PLATELET # BLD AUTO: 380 10^3/UL (ref 150–450)
PLATELET BLD QL SMEAR: NORMAL
POTASSIUM SERPL-SCNC: 3.5 MEQ/L (ref 3.5–5.1)
PROT SERPL-MCNC: 7.2 GM/DL (ref 6.4–8.2)
PROT UR QL STRIP.AUTO: NEGATIVE MG/DL
RBC # BLD AUTO: 4.6 10^6/UL (ref 4–5.4)
RBC # UR AUTO: 3 /HPF (ref 0–3)
RBC MORPH BLD: NORMAL
SODIUM SERPL-SCNC: 139 MEQ/L (ref 136–145)
SP GR UR STRIP.AUTO: 1.01 (ref 1–1.03)
SQUAMOUS #/AREA URNS AUTO: 5 /HPF (ref 0–6)
TRIGL SERPL-MCNC: 78 MG/DL (ref ?–150)
TSH SERPL DL<=0.005 MIU/L-ACNC: 5.22 UIU/ML (ref 0.36–3.74)
UROBILINOGEN UR QL STRIP.AUTO: 0.2 MG/DL (ref 0–2)
VARIANT LYMPHS NFR BLD MANUAL: 2 % (ref 0–5)
WBC # BLD AUTO: 12.4 10^3/UL (ref 4–10)
WBC #/AREA URNS AUTO: 4 /HPF (ref 0–3)

## 2019-04-16 ENCOUNTER — HOSPITAL ENCOUNTER (OUTPATIENT)
Dept: HOSPITAL 53 - M LAB REF | Age: 24
End: 2019-04-16
Attending: NURSE PRACTITIONER
Payer: COMMERCIAL

## 2019-04-16 DIAGNOSIS — Z13.9: Primary | ICD-10-CM

## 2019-04-16 LAB
CHLAMYDIA DNA AMPLIFICATION: NEGATIVE
N GONORRHOEA RRNA SPEC QL NAA+PROBE: NEGATIVE

## 2019-08-27 ENCOUNTER — HOSPITAL ENCOUNTER (OUTPATIENT)
Dept: HOSPITAL 53 - M OPP | Age: 24
Discharge: HOME | End: 2019-08-27
Attending: INTERNAL MEDICINE
Payer: COMMERCIAL

## 2019-08-27 VITALS — SYSTOLIC BLOOD PRESSURE: 121 MMHG | DIASTOLIC BLOOD PRESSURE: 73 MMHG

## 2019-08-27 VITALS — BODY MASS INDEX: 33.01 KG/M2 | WEIGHT: 205.4 LBS | HEIGHT: 66 IN

## 2019-08-27 DIAGNOSIS — Z79.899: ICD-10-CM

## 2019-08-27 DIAGNOSIS — K64.8: ICD-10-CM

## 2019-08-27 DIAGNOSIS — R93.3: Primary | ICD-10-CM

## 2019-08-27 NOTE — ROOR
________________________________________________________________________________

Patient Name: aDyana Coleman             Procedure Date: 8/27/2019 7:27 AM

MRN: [id number]                          Account Number: [id number]

YOB: 1995              Age: 23

Room: [facility]                            Gender: Female

Note Status: Finalized                 

________________________________________________________________________________

 

Procedure:           Colonoscopy

Indications:         Abnormal CT of the GI tract, Suspected left-sided chronic 

                     ulcerative colitis

Providers:           Michael Clayton MD

Referring MD:        Katarzyna MUELLER NP

Requesting Provider: 

Medicines:           Monitored Anesthesia Care

Complications:       No immediate complications.

________________________________________________________________________________

Procedure:           Pre-Anesthesia Assessment:

                     - Prior to the procedure, a History and Physical was 

                     performed, and patient medications and allergies were 

                     reviewed. The patient is competent. The risks and 

                     benefits of the procedure and the sedation options and 

                     risks were discussed with the patient. All questions were 

                     answered and informed consent was obtained. Patient 

                     identification and proposed procedure were verified by 

                     the physician, the nurse and the anesthesiologist in the 

                     procedure room. Mental Status Examination: alert and 

                     oriented. Airway Examination: normal oropharyngeal airway 

                     and neck mobility. Respiratory Examination: clear to 

                     auscultation. CV Examination: normal. Prophylactic 

                     Antibiotics: The patient does not require prophylactic 

                     antibiotics. Prior Anticoagulants: The patient has taken 

                     no previous anticoagulant or antiplatelet agents. ASA 

                     Grade Assessment: I - A normal, healthy patient. After 

                     reviewing the risks and benefits, the patient was deemed 

                     in satisfactory condition to undergo the procedure. The 

                     anesthesia plan was to use monitored anesthesia care 

                     (MAC). Immediately prior to administration of 

                     medications, the patient was re-assessed for adequacy to 

                     receive sedatives. The heart rate, respiratory rate, 

                     oxygen saturations, blood pressure, adequacy of pulmonary 

                     ventilation, and response to care were monitored 

                     throughout the procedure. The physical status of the 

                     patient was re-assessed after the procedure.

                     The Colonoscope was introduced through the anus and 

                     advanced to the terminal ileum, with identification of 

                     the appendiceal orifice and IC valve. The colonoscopy was 

                     performed without difficulty. The patient tolerated the 

                     procedure well. The quality of the bowel preparation was 

                     excellent. The terminal ileum, ileocecal valve, 

                     appendiceal orifice, and rectum were photographed. Scope 

                     insertion time was 3 minutes. Scope withdrawal time was 9 

                     minutes. The total duration of the procedure was 12 

                     minutes.

                                                                                

Findings:

     The perianal and digital rectal examinations were normal.

     The terminal ileum appeared normal. Biopsies were taken with a cold 

     forceps for histology. Verification of patient identification for the 

     specimen was done by the physician and nurse using the patient's name, 

     birth date and medical record number. Estimated blood loss was minimal.

     Normal mucosa was found in the entire colon. Biopsies for histology were 

     taken with a cold forceps from the right colon, left colon and 

     rectosigmoid colon for evaluation of microscopic colitis.

     Non-bleeding external and internal hemorrhoids were found during 

     retroflexion. The hemorrhoids were small.

                                                                                

Impression:          - The examined portion of the ileum was normal. Biopsied.

                     - Normal mucosa in the entire examined colon. Biopsied.

                     - Non-bleeding external and internal hemorrhoids.

Recommendation:      - Patient has a contact number available for emergencies. 

                     The signs and symptoms of potential delayed complications 

                     were discussed with the patient. Return to normal 

                     activities tomorrow. Written discharge instructions were 

                     provided to the patient.

                     - Resume previous diet.

                     - Continue present medications.

                     - Await pathology results.

                     - Repeat colonoscopy at age 48 for screening purposes.

                     - Telephone GI clinic for pathology results in 2 weeks.

                     - Return to primary care physician.

                                                                                

 

Michael Clayton MD

_______________________

Michael Clayton MD

8/27/2019 8:02:57 AM

Electronically signed by Michael Clayton MD

Number of Addenda: 0

 

Note Initiated On: 8/27/2019 7:27 AM

Estimated Blood Loss:

     Estimated blood loss was minimal.

## 2020-01-21 ENCOUNTER — HOSPITAL ENCOUNTER (OUTPATIENT)
Dept: HOSPITAL 53 - M LAB REF | Age: 25
End: 2020-01-21
Attending: NURSE PRACTITIONER
Payer: COMMERCIAL

## 2020-01-21 DIAGNOSIS — R53.81: Primary | ICD-10-CM

## 2020-01-21 LAB
25(OH)D3 SERPL-MCNC: 22.6 NG/ML (ref 30–100)
ALBUMIN SERPL BCG-MCNC: 4.2 GM/DL (ref 3.2–5.2)
ALT SERPL W P-5'-P-CCNC: 12 U/L (ref 12–78)
BASOPHILS # BLD AUTO: 0 10^3/UL (ref 0–0.2)
BASOPHILS NFR BLD AUTO: 0.5 % (ref 0–1)
BILIRUB SERPL-MCNC: 0.4 MG/DL (ref 0.2–1)
BUN SERPL-MCNC: 6 MG/DL (ref 7–18)
CALCIUM SERPL-MCNC: 8.5 MG/DL (ref 8.5–10.1)
CHLORIDE SERPL-SCNC: 108 MEQ/L (ref 98–107)
CO2 SERPL-SCNC: 27 MEQ/L (ref 21–32)
CREAT SERPL-MCNC: 0.73 MG/DL (ref 0.55–1.3)
EOSINOPHIL # BLD AUTO: 0.2 10^3/UL (ref 0–0.5)
EOSINOPHIL NFR BLD AUTO: 2.5 % (ref 0–3)
EST. AVERAGE GLUCOSE BLD GHB EST-MCNC: 94 MG/DL (ref 60–110)
GFR SERPL CREATININE-BSD FRML MDRD: > 60 ML/MIN/{1.73_M2} (ref 60–?)
GLUCOSE SERPL-MCNC: 73 MG/DL (ref 70–100)
HCT VFR BLD AUTO: 38.1 % (ref 36–47)
HETEROPH AB SER QL LA: NEGATIVE
HGB BLD-MCNC: 12.4 G/DL (ref 12–15.5)
IRON SERPL-MCNC: 44 UG/DL (ref 50–170)
LYMPHOCYTES # BLD AUTO: 2.3 10^3/UL (ref 1.5–5)
LYMPHOCYTES NFR BLD AUTO: 35.6 % (ref 24–44)
MCH RBC QN AUTO: 28.4 PG (ref 27–33)
MCHC RBC AUTO-ENTMCNC: 32.5 G/DL (ref 32–36.5)
MCV RBC AUTO: 87.4 FL (ref 80–96)
MONOCYTES # BLD AUTO: 0.6 10^3/UL (ref 0–0.8)
MONOCYTES NFR BLD AUTO: 8.7 % (ref 0–5)
NEUTROPHILS # BLD AUTO: 3.4 10^3/UL (ref 1.5–8.5)
NEUTROPHILS NFR BLD AUTO: 52.4 % (ref 36–66)
PLATELET # BLD AUTO: 325 10^3/UL (ref 150–450)
POTASSIUM SERPL-SCNC: 3.6 MEQ/L (ref 3.5–5.1)
PROT SERPL-MCNC: 7.8 GM/DL (ref 6.4–8.2)
RBC # BLD AUTO: 4.36 10^6/UL (ref 4–5.4)
SODIUM SERPL-SCNC: 141 MEQ/L (ref 136–145)
T4 FREE SERPL-MCNC: 0.98 NG/DL (ref 0.76–1.46)
TSH SERPL DL<=0.005 MIU/L-ACNC: 2.01 UIU/ML (ref 0.36–3.74)
WBC # BLD AUTO: 6.5 10^3/UL (ref 4–10)

## 2020-01-24 LAB
B BURGDOR IGG+IGM SER-ACNC: <0.91 ISR (ref 0–0.9)
B BURGDOR IGM SER IA-ACNC: <0.8 INDEX (ref 0–0.79)
EBV NA IGG SER-ACNC: 533 U/ML (ref 0–17.9)